# Patient Record
Sex: MALE | URBAN - METROPOLITAN AREA
[De-identification: names, ages, dates, MRNs, and addresses within clinical notes are randomized per-mention and may not be internally consistent; named-entity substitution may affect disease eponyms.]

---

## 2017-08-01 ENCOUNTER — HOSPITAL ENCOUNTER (EMERGENCY)
Facility: CLINIC | Age: 57
Discharge: HOME | End: 2017-08-01
Attending: EMERGENCY MEDICINE | Admitting: EMERGENCY MEDICINE
Payer: COMMERCIAL

## 2017-08-01 VITALS
HEIGHT: 72 IN | SYSTOLIC BLOOD PRESSURE: 123 MMHG | DIASTOLIC BLOOD PRESSURE: 98 MMHG | RESPIRATION RATE: 16 BRPM | OXYGEN SATURATION: 99 % | HEART RATE: 70 BPM | TEMPERATURE: 97.9 F | WEIGHT: 215 LBS | BODY MASS INDEX: 29.12 KG/M2

## 2017-08-01 DIAGNOSIS — R03.0 ELEVATED BLOOD PRESSURE READING WITHOUT DIAGNOSIS OF HYPERTENSION: ICD-10-CM

## 2017-08-01 LAB
ANION GAP SERPL CALCULATED.3IONS-SCNC: 6 MMOL/L (ref 3–14)
BUN SERPL-MCNC: 16 MG/DL (ref 7–30)
CALCIUM SERPL-MCNC: 8.7 MG/DL (ref 8.5–10.1)
CHLORIDE SERPL-SCNC: 105 MMOL/L (ref 94–109)
CO2 SERPL-SCNC: 29 MMOL/L (ref 20–32)
CREAT SERPL-MCNC: 0.84 MG/DL (ref 0.66–1.25)
GFR SERPL CREATININE-BSD FRML MDRD: ABNORMAL ML/MIN/1.7M2
GLUCOSE SERPL-MCNC: 107 MG/DL (ref 70–99)
POTASSIUM SERPL-SCNC: 3.6 MMOL/L (ref 3.4–5.3)
SODIUM SERPL-SCNC: 140 MMOL/L (ref 133–144)
TROPONIN I SERPL-MCNC: NORMAL UG/L (ref 0–0.04)

## 2017-08-01 PROCEDURE — 25000132 ZZH RX MED GY IP 250 OP 250 PS 637: Performed by: EMERGENCY MEDICINE

## 2017-08-01 PROCEDURE — 80048 BASIC METABOLIC PNL TOTAL CA: CPT | Performed by: EMERGENCY MEDICINE

## 2017-08-01 PROCEDURE — 84484 ASSAY OF TROPONIN QUANT: CPT | Performed by: EMERGENCY MEDICINE

## 2017-08-01 PROCEDURE — 93005 ELECTROCARDIOGRAM TRACING: CPT

## 2017-08-01 PROCEDURE — 99284 EMERGENCY DEPT VISIT MOD MDM: CPT

## 2017-08-01 RX ORDER — CLONIDINE HYDROCHLORIDE 0.1 MG/1
0.1 TABLET ORAL ONCE
Status: COMPLETED | OUTPATIENT
Start: 2017-08-01 | End: 2017-08-01

## 2017-08-01 RX ORDER — CHLORTHALIDONE 25 MG/1
25 TABLET ORAL DAILY
Qty: 4 TABLET | Refills: 0 | Status: SHIPPED | OUTPATIENT
Start: 2017-08-01

## 2017-08-01 RX ADMIN — CLONIDINE HYDROCHLORIDE 0.1 MG: 0.1 TABLET ORAL at 13:47

## 2017-08-01 ASSESSMENT — ENCOUNTER SYMPTOMS: HEADACHES: 1

## 2017-08-01 NOTE — ED AVS SNAPSHOT
[facility] Emergency Department    [address]    [facility] [address]    Phone:  [phone]    Fax:  [phone]                                       Marvin Olivia   MRN: [id number]    Department:  [facility] Emergency Department   Date of Visit:  8/1/2017           After Visit Summary Signature Page     I have received my discharge instructions, and my questions have been answered. I have discussed any challenges I see with this plan with the nurse or doctor.    ..........................................................................................................................................  Patient/Patient Representative Signature      ..........................................................................................................................................  Patient Representative Print Name and Relationship to Patient    ..................................................               ................................................  Date                                            Time    ..........................................................................................................................................  Reviewed by Signature/Title    ...................................................              ..............................................  Date                                                            Time

## 2017-08-01 NOTE — ED PROVIDER NOTES
"  History     Chief Complaint:  Hypertension    HPI   Marvin Olivia is a 57 year old male, with no known history of hypertension, who presents to the emergency department for evaluation of hypertension. The patient reports that he has had high blood pressure readings at home starting approximately 1.5 weeks ago, that are associated with occasional blurred vision, pounding of the head (but not headache) and some burning sensation in his chest. The patient is due for an appointment with his PCP in 2 days, but was worried due to such high readings that he presented to the emergency department. BP 160s/90s just before arrival.  The patient does note that he has at recent blood work at a clinic in [place], which was normal.     Allergies:  No Known Drug Allergies      Medications:    The patient is not currently taking any prescribed medications.     Past Medical History:    Cancer    Past Surgical History:    Abdomen surgery - for colon cancer    Family History:    The patient denies any relevant family medical history.     Social History:  The patient was accompanied to the ED alone.  Smoking Status: No  Smokeless Tobacco: N/A  Alcohol Use: N/A   Marital Status:  Single [1]     Review of Systems   Constitutional:        High BP readings   Eyes: Positive for visual disturbance (Occasional blurred vision).   Cardiovascular:        \"Burning sensation in chest\"   Neurological: Positive for headaches (Occasional pounding of head).   All other systems reviewed and are negative.      Physical Exam   Vitals:  Patient Vitals for the past 24 hrs:   BP Temp Temp src Pulse Heart Rate Resp SpO2 Height Weight   08/01/17 1445 (!) 123/98 - - - 67 - 99 % - -   08/01/17 1430 121/89 - - - 62 - - - -   08/01/17 1415 (!) 126/92 - - - - - - - -   08/01/17 1400 (!) 138/102 - - - 67 - - - -   08/01/17 1345 (!) 126/93 - - - 65 - 98 % - -   08/01/17 1328 (!) 144/99 97.9  F (36.6  C) Oral 70 70 16 98 % 1.829 m (6') 97.5 kg (215 lb)    "   Physical Exam  General: Cooperative  Head:  The scalp, face, and head appear normal and symmetric  Eyes:  Sclera white; pupils equal, EOMI  ENT:    External ears and nares normal  Neck:  No meningismus  CV:  Regular rate and rhythm  No murmur  No BLE edema   Resp:  Breath sounds clear and equal bilaterally  GI:  Abdomen is soft, non-tender, non-distended  MS:  Moves all extremities  Neuro: Speech is normal and fluent. No apparent deficit    Strength 5/5 x4.  Sensation intact x4.      Cranial nerves II-XII intact by examination.    Normal gait      Emergency Department Course     ECG:  ECG taken at 1329, ECG read at 1337  Normal sinus rhythm  Normal ECG  Rate 67 bpm. OR interval 164. QRS duration 106. QT/QTc 400/422. P-R-T axes 39 62 47.     Laboratory:  Laboratory findings were communicated with the patient who voiced understanding of the findings.  BMP: Glucose 107 (H) o/w WNL (Creatinine 0.84)  Troponin (Collected 1355): <0.015     Interventions:  1347 Clonidine Tablet 0.1 mg PO     Emergency Department Course:  Nursing notes and vitals reviewed.  I performed an exam of the patient as documented above.   EKG obtained in the ED, see results above.    IV was inserted and blood was drawn for laboratory testing, results above.     I discussed the treatment plan with the patient. They expressed understanding of this plan and consented to discharge. They will be discharged home with instructions for care and follow up. In addition, the patient will return to the emergency department if their symptoms persist, worsen, if new symptoms arise or if there is any concern.  All questions were answered.     I personally reviewed the laboratory results with the Patient and answered all related questions prior to discharge.    Impression & Plan      Medical Decision Making:  Marvin Olivia is a 57 year old male who presents for evaluation of elevated blood pressure.  There is no history of hypertension in the past.  The  workup here is negative and the patient does not have any clinical, laboratory, ECG or historical signs of end-organ dysfunction.  There is no signs of hypertensive emergency or urgency.  Supportive outpatient management is therefore indicated with close follow-up of primary care physician.  Given data obtained here in ED, will initiate chlorthalidone for therapy at this time and encouraged serial blood pressure monitoring at home to aid primary in decision making regarding hypertension.      Diagnosis:    ICD-10-CM    1. Elevated blood pressure reading without diagnosis of hypertension R03.0         Disposition:   Discharged.    Discharge Medications:  New Prescriptions    CHLORTHALIDONE (HYGROTON) 25 MG TABLET    Take 1 tablet (25 mg) by mouth daily       Scribe Disclosure:  IRonna, am serving as a scribe at 1:31 PM on 8/1/2017 to document services personally performed by Rachael Jesus, *, based on my observations and the provider's statements to me. 8/1/2017   [facility] EMERGENCY DEPARTMENT       Rachael Jesus MD  08/01/17 8334

## 2017-08-01 NOTE — ED NOTES
Patient reports high blood pressure readings at home starting about a week and a half ago. Some occasional blurred vision and occasional pounding in his head.

## 2017-08-01 NOTE — ED AVS SNAPSHOT
[facility] Emergency Department    [address]    [facility] [address]    Phone:  [phone]    Fax:  [phone]                                       Marvin Olivia   MRN: [id number]    Department:  [facility] Emergency Department   Date of Visit:  8/1/2017           Patient Information     Date Of Birth          1960        Your diagnoses for this visit were:     Elevated blood pressure reading without diagnosis of hypertension        You were seen by Rachael Jesus MD.      Follow-up Information     Follow up with In clinic.        Follow up with [facility] Emergency Department.    Specialty:  EMERGENCY MEDICINE    Why:  If symptoms worsen    Contact information:    [address]  [facility] [address] [phone]        Discharge Instructions       Discharge Instructions  Hypertension - High Blood Pressure    During you visit to the Emergency Department, your blood pressure was higher than the recommended blood pressure.  This may be related to stress, pain, medication or other temporary conditions. In these cases, your blood pressure may return to normal on its own. If you have a history of high blood pressure, you may need to have your doctor adjust your medications. Sometimes, your high measurement here may indicate that you have developed high blood pressure that will stay high unless it is treated. Sudden very high blood pressure can cause problems, but usually high blood pressure causes problems over months to years.      Blood pressure is almost never lowered in the Emergency Department, because studies have shown that lowering blood pressure too quickly is much more dangerous than leaving it alone.    You need to follow up with your doctor in 1-3 days to get your blood pressure rechecked.     Return to the Emergency Department if you start to have:    A severe headache.    Chest pain.    Shortness of  breath.    Weakness or numbness that affects one part of the body.    Confusion.    Vision changes.    Significant swelling of legs and/or eyes.    A reaction to any medication started in the Emergency Department.    What can I do to help myself?    Avoid alcohol.    Take any blood pressure medicine that you are prescribed.    Get a good night s sleep.    Lower your salt intake.    Exercise.    Lose weight.    Manage stress.    If blood pressure medication was started in the Emergency Department:    The medicine may not have an immediate effect. The body and brain determine what blood pressure you have. The medicine s job is to retrain the body s  thermostat  to a lower blood pressure.    You will need to follow up with your doctor to see how this medicine is working for you.  If you were given a prescription for medicine here today, be sure to read all of the information (including the package insert) that comes with your prescription.  This will include important information about the medicine, its side effects, and any warnings that you need to know about.  The pharmacist who fills the prescription can provide more information and answer questions you may have about the medicine.  If you have questions or concerns that the pharmacist cannot address, please call or return to the Emergency Department.       Remember that you can always come back to the Emergency Department if you are not able to see your regular doctor in the amount of time listed above, if you get any new symptoms, or if there is anything that worries you.        24 Hour Appointment Hotline       To make an appointment at any [facility] call [phone] ([phone]). If you don't have a family doctor or clinic, we will help you find one. [facility] are conveniently located to serve the needs of you and your family.             Review of your medicines      START taking        Dose / Directions Last dose taken    chlorthalidone  25 MG tablet   Commonly known as:  HYGROTON   Dose:  25 mg   Quantity:  4 tablet        Take 1 tablet (25 mg) by mouth daily   Refills:  0                Prescriptions were sent or printed at these locations (1 Prescription)                   Other Prescriptions                Printed at Department/Unit printer (1 of 1)         chlorthalidone (HYGROTON) 25 MG tablet                Procedures and tests performed during your visit     Basic metabolic panel    EKG 12 lead    Patient care order    Strict intake and output    Troponin I      Orders Needing Specimen Collection     None      Pending Results     Date and Time Order Name Status Description    8/1/2017 1327 EKG 12 lead Preliminary             Pending Culture Results     No orders found from 7/30/2017 to 8/2/2017.            Pending Results Instructions     If you had any lab results that were not finalized at the time of your Discharge, you can call the ED Lab Result RN at [phone]. You will be contacted by this team for any positive Lab results or changes in treatment. The nurses are available 7 days a week from 10A to 6:30P.  You can leave a message 24 hours per day and they will return your call.        Test Results From Your Hospital Stay        8/1/2017  2:50 PM      Component Results     Component Value Ref Range & Units Status    Sodium 140 133 - 144 mmol/L Final    Potassium 3.6 3.4 - 5.3 mmol/L Final    Chloride 105 94 - 109 mmol/L Final    Carbon Dioxide 29 20 - 32 mmol/L Final    Anion Gap 6 3 - 14 mmol/L Final    Glucose 107 (H) 70 - 99 mg/dL Final    Urea Nitrogen 16 7 - 30 mg/dL Final    Creatinine 0.84 0.66 - 1.25 mg/dL Final    GFR Estimate >90  Non  GFR Calc   >60 mL/min/1.7m2 Final    GFR Estimate If Black >90   GFR Calc   >60 mL/min/1.7m2 Final    Calcium 8.7 8.5 - 10.1 mg/dL Final         8/1/2017  2:50 PM      Component Results     Component Value Ref Range & Units Status    Troponin I ES  0.000 - 0.045  ug/L Final    <0.015  The 99th percentile for upper reference range is 0.045 ug/L.  Troponin values in   the range of 0.045 - 0.120 ug/L may be associated with risks of adverse   clinical events.                  Clinical Quality Measure: Blood Pressure Screening     Your blood pressure was checked while you were in the emergency department today. The last reading we obtained was  BP: (!) 123/98 . Please read the guidelines below about what these numbers mean and what you should do about them.  If your systolic blood pressure (the top number) is less than 120 and your diastolic blood pressure (the bottom number) is less than 80, then your blood pressure is normal. There is nothing more that you need to do about it.  If your systolic blood pressure (the top number) is 120-139 or your diastolic blood pressure (the bottom number) is 80-89, your blood pressure may be higher than it should be. You should have your blood pressure rechecked within a year by a primary care provider.  If your systolic blood pressure (the top number) is 140 or greater or your diastolic blood pressure (the bottom number) is 90 or greater, you may have high blood pressure. High blood pressure is treatable, but if left untreated over time it can put you at risk for heart attack, stroke, or kidney failure. You should have your blood pressure rechecked by a primary care provider within the next 4 weeks.  If your provider in the emergency department today gave you specific instructions to follow-up with your doctor or provider even sooner than that, you should follow that instruction and not wait for up to 4 weeks for your follow-up visit.        Thank you for choosing [facility]       Thank you for choosing [facility] for your care. Our goal is always to provide you with excellent care. Hearing back from our patients is one way we can continue to improve our services. Please take a few minutes to complete the written survey that you may receive in the  "mail after you visit with us. Thank you!        [facility] Information     [facility] lets you send messages to your doctor, view your test results, renew your prescriptions, schedule appointments and more. To sign up, go to [email or web address] . Click on \"Log in\" on the left side of the screen, which will take you to the Welcome page. Then click on \"Sign up Now\" on the right side of the page.     You will be asked to enter the access code listed below, as well as some personal information. Please follow the directions to create your username and password.     Your access code is: [email or web address]  Expires: 10/30/2017  3:01 PM     Your access code will  in 90 days. If you need help or a new code, please call your [place] clinic or [phone].        Care EveryWhere ID     This is your Care EveryWhere ID. This could be used by other organizations to access your [facility] medical records  [id number]        Equal Access to Services     NIURKA EDMONDSON : Hadii aad ku hadasho Sorobbieali, waaxda luqadaha, qaybta kaalmada adekianyaan, shelby nicolas . So [facility] [phone].    ATENCIÓN: Si habla español, tiene a rockwell disposición servicios gratuitos de asistencia lingüística. Llame al [phone].    We comply with applicable federal civil rights laws and [place] laws. We do not discriminate on the basis of race, color, national origin, age, disability sex, sexual orientation or gender identity.            After Visit Summary       This is your record. Keep this with you and show to your community pharmacist(s) and doctor(s) at your next visit.                  "

## 2017-08-01 NOTE — DISCHARGE INSTRUCTIONS
Discharge Instructions  Hypertension - High Blood Pressure    During you visit to the Emergency Department, your blood pressure was higher than the recommended blood pressure.  This may be related to stress, pain, medication or other temporary conditions. In these cases, your blood pressure may return to normal on its own. If you have a history of high blood pressure, you may need to have your doctor adjust your medications. Sometimes, your high measurement here may indicate that you have developed high blood pressure that will stay high unless it is treated. Sudden very high blood pressure can cause problems, but usually high blood pressure causes problems over months to years.      Blood pressure is almost never lowered in the Emergency Department, because studies have shown that lowering blood pressure too quickly is much more dangerous than leaving it alone.    You need to follow up with your doctor in 1-3 days to get your blood pressure rechecked.     Return to the Emergency Department if you start to have:    A severe headache.    Chest pain.    Shortness of breath.    Weakness or numbness that affects one part of the body.    Confusion.    Vision changes.    Significant swelling of legs and/or eyes.    A reaction to any medication started in the Emergency Department.    What can I do to help myself?    Avoid alcohol.    Take any blood pressure medicine that you are prescribed.    Get a good night s sleep.    Lower your salt intake.    Exercise.    Lose weight.    Manage stress.    If blood pressure medication was started in the Emergency Department:    The medicine may not have an immediate effect. The body and brain determine what blood pressure you have. The medicine s job is to retrain the body s  thermostat  to a lower blood pressure.    You will need to follow up with your doctor to see how this medicine is working for you.  If you were given a prescription for medicine here today, be sure to read all of  the information (including the package insert) that comes with your prescription.  This will include important information about the medicine, its side effects, and any warnings that you need to know about.  The pharmacist who fills the prescription can provide more information and answer questions you may have about the medicine.  If you have questions or concerns that the pharmacist cannot address, please call or return to the Emergency Department.       Remember that you can always come back to the Emergency Department if you are not able to see your regular doctor in the amount of time listed above, if you get any new symptoms, or if there is anything that worries you.

## 2017-08-02 LAB — INTERPRETATION ECG - MUSE: NORMAL

## 2017-08-10 ENCOUNTER — HOSPITAL ENCOUNTER (EMERGENCY)
Facility: CLINIC | Age: 57
Discharge: HOME | End: 2017-08-10
Attending: EMERGENCY MEDICINE | Admitting: EMERGENCY MEDICINE
Payer: COMMERCIAL

## 2017-08-10 VITALS
HEART RATE: 90 BPM | DIASTOLIC BLOOD PRESSURE: 85 MMHG | RESPIRATION RATE: 16 BRPM | SYSTOLIC BLOOD PRESSURE: 124 MMHG | BODY MASS INDEX: 29.02 KG/M2 | WEIGHT: 214 LBS | TEMPERATURE: 98 F | OXYGEN SATURATION: 97 %

## 2017-08-10 DIAGNOSIS — I10 ESSENTIAL HYPERTENSION: ICD-10-CM

## 2017-08-10 DIAGNOSIS — R07.9 NONSPECIFIC CHEST PAIN: ICD-10-CM

## 2017-08-10 LAB
ANION GAP SERPL CALCULATED.3IONS-SCNC: 7 MMOL/L (ref 3–14)
BASOPHILS # BLD AUTO: 0.1 10E9/L (ref 0–0.2)
BASOPHILS NFR BLD AUTO: 0.8 %
BUN SERPL-MCNC: 14 MG/DL (ref 7–30)
CALCIUM SERPL-MCNC: 9.1 MG/DL (ref 8.5–10.1)
CHLORIDE SERPL-SCNC: 99 MMOL/L (ref 94–109)
CO2 SERPL-SCNC: 31 MMOL/L (ref 20–32)
CREAT SERPL-MCNC: 0.77 MG/DL (ref 0.66–1.25)
DIFFERENTIAL METHOD BLD: NORMAL
EOSINOPHIL # BLD AUTO: 0.3 10E9/L (ref 0–0.7)
EOSINOPHIL NFR BLD AUTO: 4.1 %
ERYTHROCYTE [DISTWIDTH] IN BLOOD BY AUTOMATED COUNT: 11.5 % (ref 10–15)
GFR SERPL CREATININE-BSD FRML MDRD: ABNORMAL ML/MIN/1.7M2
GLUCOSE SERPL-MCNC: 103 MG/DL (ref 70–99)
HCT VFR BLD AUTO: 43.8 % (ref 40–53)
HGB BLD-MCNC: 15.4 G/DL (ref 13.3–17.7)
IMM GRANULOCYTES # BLD: 0 10E9/L (ref 0–0.4)
IMM GRANULOCYTES NFR BLD: 0.4 %
LYMPHOCYTES # BLD AUTO: 2.2 10E9/L (ref 0.8–5.3)
LYMPHOCYTES NFR BLD AUTO: 31.4 %
MAGNESIUM SERPL-MCNC: 2.5 MG/DL (ref 1.6–2.3)
MCH RBC QN AUTO: 30.2 PG (ref 26.5–33)
MCHC RBC AUTO-ENTMCNC: 35.2 G/DL (ref 31.5–36.5)
MCV RBC AUTO: 86 FL (ref 78–100)
MONOCYTES # BLD AUTO: 0.6 10E9/L (ref 0–1.3)
MONOCYTES NFR BLD AUTO: 7.9 %
NEUTROPHILS # BLD AUTO: 3.9 10E9/L (ref 1.6–8.3)
NEUTROPHILS NFR BLD AUTO: 55.4 %
NRBC # BLD AUTO: 0 10*3/UL
NRBC BLD AUTO-RTO: 0 /100
PLATELET # BLD AUTO: 306 10E9/L (ref 150–450)
POTASSIUM SERPL-SCNC: 3.5 MMOL/L (ref 3.4–5.3)
RBC # BLD AUTO: 5.1 10E12/L (ref 4.4–5.9)
SODIUM SERPL-SCNC: 137 MMOL/L (ref 133–144)
TROPONIN I SERPL-MCNC: NORMAL UG/L (ref 0–0.04)
TROPONIN I SERPL-MCNC: NORMAL UG/L (ref 0–0.04)
WBC # BLD AUTO: 7.1 10E9/L (ref 4–11)

## 2017-08-10 PROCEDURE — 84484 ASSAY OF TROPONIN QUANT: CPT | Performed by: EMERGENCY MEDICINE

## 2017-08-10 PROCEDURE — 93005 ELECTROCARDIOGRAM TRACING: CPT

## 2017-08-10 PROCEDURE — 80048 BASIC METABOLIC PNL TOTAL CA: CPT | Performed by: EMERGENCY MEDICINE

## 2017-08-10 PROCEDURE — 85025 COMPLETE CBC W/AUTO DIFF WBC: CPT | Performed by: EMERGENCY MEDICINE

## 2017-08-10 PROCEDURE — 99284 EMERGENCY DEPT VISIT MOD MDM: CPT

## 2017-08-10 PROCEDURE — 83735 ASSAY OF MAGNESIUM: CPT | Performed by: EMERGENCY MEDICINE

## 2017-08-10 ASSESSMENT — ENCOUNTER SYMPTOMS: DIZZINESS: 0

## 2017-08-10 NOTE — ED NOTES
Patient states he was seen in the ED last week for high blood pressure. Patient states she as been taking blood pressure medication and checking his blood pressure all week and it remains high. BP at home today was 155/110.  ABC intact alert and no distress.

## 2017-08-10 NOTE — ED PROVIDER NOTES
"  History     Chief Complaint:  Hypertension    HPI   Marvin Olivia is a 57 year old male with a history of hypertension who presents for evaluation of hypertension. The patient was recently seen here at [facility] nine days ago and prescribed a couple tablets of Hygroton (25mg) and told to follow up with his physician. He went to [place] and the physician he met with suggested he start with 12.5 mg, but he complains that did not work. He states he then sent the doctor an email and was told to raise the dose to 1 tablet per day. He has been taking 1 tablet per day for the past four days and comes in tonight because he does not think it is working and he states he has measured some high pressures (references lower diastolic number at 110 today). Of note, the patient mentions having a couple minutes of chest pressure at about 5pm tonight. He does not have any chest pain here in the ED. He denies dizziness, palpitations, dyspnea, headache or other new symptoms.    Allergies:  No known drug allergies    Medications:    Hygroton     Past Medical History:    Colon Cancer  Hypertension    Past Surgical History:    Abdomen surgery for colon cancer    Family History:    No past pertinent family history.    Social History:  The patient was in the ED alone.  Smoking Status: No  Smokeless Tobacco: No  Alcohol Use: No  Marital Status:  Single      Review of Systems   Cardiovascular: Positive for chest pain (\"pressure\" ).   Neurological: Negative for dizziness.   All other systems reviewed and are negative.    Physical Exam   First Vitals:  BP: (!) 144/99  Pulse: 90  Temp: 98  F (36.7  C)  Resp: 20  Weight: 97.1 kg (214 lb)  SpO2: 98 %      Physical Exam  Constitutional:  Well developed, Well nourished, comfortable appearing. Anxious.  HENT:  Bilateral external ears normal, Mucous membranes moist, Nose normal. Neck- Normal range of motion, Supple  Respiratory:  Normal breath sounds, No respiratory distress, No " wheezing,  Cardiovascular:  Normal heart rate, Normal rhythm, No murmurs,    Musculoskeletal:  Intact distal pulses, No edema, grossly unremarkable range of motion   Integument:  Warm, Dry   Neurologic:  Alert, attentive and appropriately oriented  Psychiatric:  Anxious.    Emergency Department Course   ECG done at 1846. ECG read at 1850. Indication: Chest pressure  Rate 70 bpm. AK interval 164. QRS duration 102. QT/QTc 414/447. P-R-T axes 24 50 40.  Normal sinus rhythm.  Normal ECG.    Laboratory:  CBC: WBC 7.1, HGB 15.4,   BMP: Glucose 103(H) o/w WNL (Creat 0.77)  Magnesium: 2.5(H)    Troponin: <0.015  Repeat Troponin (result time 2152): <0.015    Emergency Department Course:  Nursing notes and vitals reviewed.  I performed an exam of the patient as documented above.     1955 I rechecked the patient.    Findings and plan explained to the patient. Patient discharged home with instructions regarding supportive care, medications, and reasons to return. The importance of close follow-up was reviewed.     Impression & Plan    Medical Decision Making:  Marvin Olivia is a 57 year old male who presents for evaluation of elevated blood pressure.  There is a recent history of hypertension per the HPI above.  The workup here is negative and the patient does not have any clinical, laboratory, ecg or historical signs of end-organ dysfunction.  There is no signs of hypertensive emergency or urgency.  His episode of chest pain was brief, nonspecific, and remained resolved.  As a precaution, a three hour troponin was obtained and was unremarkable.  Supportive outpatient management is therefore indicated with close follow-up of primary care physician this week to discuss medication dosing.  Given data obtained here in ED, will  the patient to increase his dose of blood pressure medication to 50 mg. Also encouraged serial blood pressure monitoring at home to aid primary in decision making regarding hypertension.   He was comfortable with plan of discharge and close follow-up.    Diagnosis:  1. (I10) Essential hypertension    2. (R07.9) Nonspecific chest pain    Disposition:  The patient will be discharged home.    8/10/2017   [facility] EMERGENCY DEPARTMENT    I, Debbie Lema, am serving as a scribe at 1819 on August 10, 2017  to document services personally performed by Dr. Lee based on my observations and the provider's statements to me.       Vika Lee MD  08/10/17 6416

## 2017-08-10 NOTE — ED AVS SNAPSHOT
[facility] Emergency Department    [address]    [facility] [address]    Phone:  [phone]    Fax:  [phone]                                       Marvin Olivia   MRN: [id number]    Department:  [facility] Emergency Department   Date of Visit:  8/10/2017           Patient Information     Date Of Birth          1960        Your diagnoses for this visit were:     Essential hypertension     Nonspecific chest pain        You were seen by Vika Lee MD.      Follow-up Information     Follow [facility] with [facility]. Schedule an appointment as soon as possible for a visit in 4 days.    Specialty:  Clinic    Contact information:    [facility] [address] [phone]          Discharge Instructions       Continue checking your blood pressure 2-3 times per day.     Start taking the chlorthalidone 50mg daily.     Discharge Instructions  Hypertension - High Blood Pressure    During you visit to the Emergency Department, your blood pressure was higher than the recommended blood pressure.  This may be related to stress, pain, medication or other temporary conditions. In these cases, your blood pressure may return to normal on its own. If you have a history of high blood pressure, you may need to have your provider adjust your medications. Sometimes, your high measurement here may indicate that you have developed high blood pressure that will stay high unless it is treated. As a general rule, high blood pressure causes problems over years rather than days, weeks, or months. So, while it is important to treat blood pressure, it is rarely important to treat blood pressure immediately. Occasionally we will begin a medication in the Emergency Department; more often we will recommend close follow-up for medications with a primary doctor/clinic.    Generally, every Emergency Department visit should have a follow-up clinic visit with either a  primary or a specialty clinic/provider. Please follow-up as instructed by your emergency provider today.    Return to the Emergency Department if you start to have:    A severe headache.    Chest pain.    Shortness of breath.    Weakness or numbness that affects one part of the body.    Confusion.    Vision changes.    Significant swelling of legs and/or eyes.    A reaction to any medication started in the Emergency Department.    What can I do to help myself?    Avoid alcohol.    Take any blood pressure medicine that you are prescribed.    Get a good night s sleep.    Lower your salt intake.    Exercise.    Lose weight.    Manage stress.    See your doctor regularly    If blood pressure medication was started in the Emergency Department:    The medicine may not have an immediate effect. The body and brain determine what blood pressure you have. The medicine s job is to retrain the body s  thermostat  to a lower blood pressure.    You will need to follow up with your provider to see how this medicine is working for you.  If you were given a prescription for medicine here today, be sure to read all of the information (including the package insert) that comes with your prescription.  This will include important information about the medicine, its side effects, and any warnings that you need to know about.  The pharmacist who fills the prescription can provide more information and answer questions you may have about the medicine.  If you have questions or concerns that the pharmacist cannot address, please call or return to the Emergency Department.   Remember that you can always come back to the Emergency Department if you are not able to see your regular provider in the amount of time listed above, if you get any new symptoms, or if there is anything that worries you.    Discharge Instructions  Chest Pain    You have been seen today for chest pain or discomfort.  At this time, your provider has found no signs that  your chest pain is due to a serious or life-threatening condition, (or you have declined more testing and/or admission to the hospital). However, sometimes there is a serious problem that does not show up right away. Your evaluation today may not be complete and you may need further testing and evaluation.     Generally, every Emergency Department visit should have a follow-up clinic visit with either a primary or a specialty clinic/provider. Please follow-up as instructed by your emergency provider today.  Return to the Emergency Department if:    Your chest pain changes, gets worse, starts to happen more often, or comes with less activity.    You are newly short of breath.    You get very weak or tired.    You pass out or faint.    You have any new symptoms, like fever, cough, numb legs, or you cough up blood.    You have anything else that worries you.    Until you follow-up with your regular provider, please do the following:    Take one aspirin daily unless you have an allergy or are told not to by your provider.    If a stress test appointment has been made, go to the appointment.    If you have questions, contact your regular provider.    Follow-up with your regular provider/clinic as directed; this is very important.    If you were given a prescription for medicine here today, be sure to read all of the information (including the package insert) that comes with your prescription.  This will include important information about the medicine, its side effects, and any warnings that you need to know about.  The pharmacist who fills the prescription can provide more information and answer questions you may have about the medicine.  If you have questions or concerns that the pharmacist cannot address, please call or return to the Emergency Department.       Remember that you can always come back to the Emergency Department if you are not able to see your regular provider in the amount of time listed above, if you  get any new symptoms, or if there is anything that worries you.      24 Hour Appointment Hotline       To make an appointment at any [facility], call [phone] ([phone]). If you don't have a family doctor or clinic, we will help you find one. [place] clinics are conveniently located to serve the needs of you and your family.             Review of your medicines      Our records show that you are taking the medicines listed below. If these are incorrect, please call your family doctor or clinic.        Dose / Directions Last dose taken    chlorthalidone 25 MG tablet   Commonly known as:  HYGROTON   Dose:  25 mg   Quantity:  4 tablet        Take 1 tablet (25 mg) by mouth daily   Refills:  0                Procedures and tests performed during your visit     Procedure/Test Number of Times Performed    Basic metabolic panel 1    CBC with platelets differential 1    Cardiac Continuous Monitoring 1    EKG 12-lead, tracing only 1    Magnesium 1    Troponin I 2      Orders Needing Specimen Collection     None      Pending Results     Date and Time Order Name Status Description    8/10/2017 1834 EKG 12-lead, tracing only Preliminary             Pending Culture Results     No orders found from 8/8/2017 to 8/11/2017.            Pending Results Instructions     If you had any lab results that were not finalized at the time of your Discharge, you can call the ED Lab Result RN at [phone]. You will be contacted by this team for any positive Lab results or changes in treatment. The nurses are available 7 days a week from 10A to 6:30P.  You can leave a message 24 hours per day and they will return your call.        Test Results From Your Hospital Stay        8/10/2017  7:21 PM      Component Results     Component Value Ref Range & Units Status    Troponin I ES  0.000 - 0.045 ug/L Final    <0.015  The 99th percentile for upper reference range is 0.045 ug/L.  Troponin values in   the range of 0.045 - 0.120 ug/L  may be associated with risks of adverse   clinical events.           8/10/2017  7:21 PM      Component Results     Component Value Ref Range & Units Status    Sodium 137 133 - 144 mmol/L Final    Potassium 3.5 3.4 - 5.3 mmol/L Final    Specimen slightly hemolyzed, potassium may be falsely elevated    Chloride 99 94 - 109 mmol/L Final    Carbon Dioxide 31 20 - 32 mmol/L Final    Anion Gap 7 3 - 14 mmol/L Final    Glucose 103 (H) 70 - 99 mg/dL Final    Urea Nitrogen 14 7 - 30 mg/dL Final    Creatinine 0.77 0.66 - 1.25 mg/dL Final    GFR Estimate >90  Non  GFR Calc   >60 mL/min/1.7m2 Final    GFR Estimate If Black >90   GFR Calc   >60 mL/min/1.7m2 Final    Calcium 9.1 8.5 - 10.1 mg/dL Final         8/10/2017  6:54 PM      Component Results     Component Value Ref Range & Units Status    WBC 7.1 4.0 - 11.0 10e9/L Final    RBC Count 5.10 4.4 - 5.9 10e12/L Final    Hemoglobin 15.4 13.3 - 17.7 g/dL Final    Hematocrit 43.8 40.0 - 53.0 % Final    MCV 86 78 - 100 fl Final    MCH 30.2 26.5 - 33.0 pg Final    MCHC 35.2 31.5 - 36.5 g/dL Final    RDW 11.5 10.0 - 15.0 % Final    Platelet Count 306 150 - 450 10e9/L Final    Diff Method Automated Method  Final    % Neutrophils 55.4 % Final    % Lymphocytes 31.4 % Final    % Monocytes 7.9 % Final    % Eosinophils 4.1 % Final    % Basophils 0.8 % Final    % Immature Granulocytes 0.4 % Final    Nucleated RBCs 0 0 /100 Final    Absolute Neutrophil 3.9 1.6 - 8.3 10e9/L Final    Absolute Lymphocytes 2.2 0.8 - 5.3 10e9/L Final    Absolute Monocytes 0.6 0.0 - 1.3 10e9/L Final    Absolute Eosinophils 0.3 0.0 - 0.7 10e9/L Final    Absolute Basophils 0.1 0.0 - 0.2 10e9/L Final    Abs Immature Granulocytes 0.0 0 - 0.4 10e9/L Final    Absolute Nucleated RBC 0.0  Final         8/10/2017  7:21 PM      Component Results     Component Value Ref Range & Units Status    Magnesium 2.5 (H) 1.6 - 2.3 mg/dL Final         8/10/2017  9:52 PM      Component Results      Component Value Ref Range & Units Status    Troponin I ES  0.000 - 0.045 ug/L Final    <0.015  The 99th percentile for upper reference range is 0.045 ug/L.  Troponin values in   the range of 0.045 - 0.120 ug/L may be associated with risks of adverse   clinical events.                  Clinical Quality Measure: Blood Pressure Screening     Your blood pressure was checked while you were in the emergency department today. The last reading we obtained was  BP: 124/85 . Please read the guidelines below about what these numbers mean and what you should do about them.  If your systolic blood pressure (the top number) is less than 120 and your diastolic blood pressure (the bottom number) is less than 80, then your blood pressure is normal. There is nothing more that you need to do about it.  If your systolic blood pressure (the top number) is 120-139 or your diastolic blood pressure (the bottom number) is 80-89, your blood pressure may be higher than it should be. You should have your blood pressure rechecked within a year by a primary care provider.  If your systolic blood pressure (the top number) is 140 or greater or your diastolic blood pressure (the bottom number) is 90 or greater, you may have high blood pressure. High blood pressure is treatable, but if left untreated over time it can put you at risk for heart attack, stroke, or kidney failure. You should have your blood pressure rechecked by a primary care provider within the next 4 weeks.  If your provider in the emergency department today gave you specific instructions to follow-up with your doctor or provider even sooner than that, you should follow that instruction and not wait for up to 4 weeks for your follow-up visit.        Thank you for choosing [facility]       Thank you for choosing [facility] for your care. Our goal is always to provide you with excellent care. Hearing back from our patients is one way we can continue to improve our services. Please take a  "few minutes to complete the written survey that you may receive in the mail after you visit with us. Thank you!        [employer] Information     [employer] lets you send messages to your doctor, view your test results, renew your prescriptions, schedule appointments and more. To sign up, go to [email or web address] . Click on \"Log in\" on the left side of the screen, which will take you to the Welcome page. Then click on \"Sign up Now\" on the right side of the page.     You will be asked to enter the access code listed below, as well as some personal information. Please follow the directions to create your username and password.     Your access code is: [email or web address]  Expires: 10/30/2017  3:01 PM     Your access code will  in 90 days. If you need help or a new code, please call your [place] clinic or [phone].        Care EveryWhere ID     This is your Care EveryWhere ID. This could be used by other organizations to access your [place] medical records  [id number]        Equal Access to Services     [facility]: Hadii linda garay Soprecious, waaxda luqadaha, qaybta kaalmaan blum, shelby nicolas . So [facility] [phone].    ATENCIÓN: Si habla español, tiene a rockwell disposición servicios gratuitos de asistencia lingüística. Llame al [phone].    We comply with applicable federal civil rights laws and [place] laws. We do not discriminate on the basis of race, color, national origin, age, disability sex, sexual orientation or gender identity.            After Visit Summary       This is your record. Keep this with you and show to your community pharmacist(s) and doctor(s) at your next visit.                  "

## 2017-08-10 NOTE — ED AVS SNAPSHOT
[facility] Emergency Department    [address]    [facility] [address]    Phone:  [phone]    Fax:  [phone]                                       Marvin Olivia   MRN: [id number]    Department:  [facility] Emergency Department   Date of Visit:  8/10/2017           After Visit Summary Signature Page     I have received my discharge instructions, and my questions have been answered. I have discussed any challenges I see with this plan with the nurse or doctor.    ..........................................................................................................................................  Patient/Patient Representative Signature      ..........................................................................................................................................  Patient Representative Print Name and Relationship to Patient    ..................................................               ................................................  Date                                            Time    ..........................................................................................................................................  Reviewed by Signature/Title    ...................................................              ..............................................  Date                                                            Time

## 2017-08-11 LAB — INTERPRETATION ECG - MUSE: NORMAL

## 2017-08-11 NOTE — ED NOTES
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 MD notified, will talk to pt.   "

## 2017-08-11 NOTE — DISCHARGE INSTRUCTIONS
Continue checking your blood pressure 2-3 times per day.     Start taking the chlorthalidone 50mg daily.     Discharge Instructions  Hypertension - High Blood Pressure    During you visit to the Emergency Department, your blood pressure was higher than the recommended blood pressure.  This may be related to stress, pain, medication or other temporary conditions. In these cases, your blood pressure may return to normal on its own. If you have a history of high blood pressure, you may need to have your provider adjust your medications. Sometimes, your high measurement here may indicate that you have developed high blood pressure that will stay high unless it is treated. As a general rule, high blood pressure causes problems over years rather than days, weeks, or months. So, while it is important to treat blood pressure, it is rarely important to treat blood pressure immediately. Occasionally we will begin a medication in the Emergency Department; more often we will recommend close follow-up for medications with a primary doctor/clinic.    Generally, every Emergency Department visit should have a follow-up clinic visit with either a primary or a specialty clinic/provider. Please follow-up as instructed by your emergency provider today.    Return to the Emergency Department if you start to have:    A severe headache.    Chest pain.    Shortness of breath.    Weakness or numbness that affects one part of the body.    Confusion.    Vision changes.    Significant swelling of legs and/or eyes.    A reaction to any medication started in the Emergency Department.    What can I do to help myself?    Avoid alcohol.    Take any blood pressure medicine that you are prescribed.    Get a good night s sleep.    Lower your salt intake.    Exercise.    Lose weight.    Manage stress.    See your doctor regularly    If blood pressure medication was started in the Emergency Department:    The medicine may not have an immediate effect.  The body and brain determine what blood pressure you have. The medicine s job is to retrain the body s  thermostat  to a lower blood pressure.    You will need to follow up with your provider to see how this medicine is working for you.  If you were given a prescription for medicine here today, be sure to read all of the information (including the package insert) that comes with your prescription.  This will include important information about the medicine, its side effects, and any warnings that you need to know about.  The pharmacist who fills the prescription can provide more information and answer questions you may have about the medicine.  If you have questions or concerns that the pharmacist cannot address, please call or return to the Emergency Department.   Remember that you can always come back to the Emergency Department if you are not able to see your regular provider in the amount of time listed above, if you get any new symptoms, or if there is anything that worries you.    Discharge Instructions  Chest Pain    You have been seen today for chest pain or discomfort.  At this time, your provider has found no signs that your chest pain is due to a serious or life-threatening condition, (or you have declined more testing and/or admission to the hospital). However, sometimes there is a serious problem that does not show up right away. Your evaluation today may not be complete and you may need further testing and evaluation.     Generally, every Emergency Department visit should have a follow-up clinic visit with either a primary or a specialty clinic/provider. Please follow-up as instructed by your emergency provider today.  Return to the Emergency Department if:    Your chest pain changes, gets worse, starts to happen more often, or comes with less activity.    You are newly short of breath.    You get very weak or tired.    You pass out or faint.    You have any new symptoms, like fever, cough, numb legs,  or you cough up blood.    You have anything else that worries you.    Until you follow-up with your regular provider, please do the following:    Take one aspirin daily unless you have an allergy or are told not to by your provider.    If a stress test appointment has been made, go to the appointment.    If you have questions, contact your regular provider.    Follow-up with your regular provider/clinic as directed; this is very important.    If you were given a prescription for medicine here today, be sure to read all of the information (including the package insert) that comes with your prescription.  This will include important information about the medicine, its side effects, and any warnings that you need to know about.  The pharmacist who fills the prescription can provide more information and answer questions you may have about the medicine.  If you have questions or concerns that the pharmacist cannot address, please call or return to the Emergency Department.       Remember that you can always come back to the Emergency Department if you are not able to see your regular provider in the amount of time listed above, if you get any new symptoms, or if there is anything that worries you.